# Patient Record
Sex: MALE | Race: BLACK OR AFRICAN AMERICAN | NOT HISPANIC OR LATINO | RURAL
[De-identification: names, ages, dates, MRNs, and addresses within clinical notes are randomized per-mention and may not be internally consistent; named-entity substitution may affect disease eponyms.]

---

## 2020-11-16 ENCOUNTER — HISTORICAL (OUTPATIENT)
Dept: ADMINISTRATIVE | Facility: HOSPITAL | Age: 70
End: 2020-11-16

## 2020-11-16 LAB
ANION GAP SERPL CALCULATED.3IONS-SCNC: 9.2 MMOL/L (ref 7–16)
BASOPHILS # BLD AUTO: 0.02 X10E3/UL (ref 0–0.2)
BASOPHILS NFR BLD AUTO: 0.6 % (ref 0–1)
BUN SERPL-MCNC: 30 MG/DL (ref 7–18)
CALCIUM SERPL-MCNC: 9.3 MG/DL (ref 8.5–10.1)
CHLORIDE SERPL-SCNC: 112 MMOL/L (ref 98–107)
CO2 SERPL-SCNC: 24 MMOL/L (ref 21–32)
CREAT SERPL-MCNC: 3.56 MG/DL (ref 0.7–1.3)
EOSINOPHIL # BLD AUTO: 0.07 X10E3/UL (ref 0–0.5)
EOSINOPHIL NFR BLD AUTO: 1.9 % (ref 1–4)
ERYTHROCYTE [DISTWIDTH] IN BLOOD BY AUTOMATED COUNT: 15.4 % (ref 11.5–14.5)
GLUCOSE SERPL-MCNC: 92 MG/DL (ref 74–106)
HCT VFR BLD AUTO: 39.9 % (ref 40–54)
HGB BLD-MCNC: 12.8 G/DL (ref 13.5–18)
IMM GRANULOCYTES # BLD AUTO: 0.01 X10E3/UL (ref 0–0.04)
IMM GRANULOCYTES NFR BLD: 0.3 % (ref 0–0.4)
INR BLD: 2.4 (ref 0–3.3)
LYMPHOCYTES # BLD AUTO: 0.93 X10E3/UL (ref 1–4.8)
LYMPHOCYTES NFR BLD AUTO: 25.8 % (ref 27–41)
MCH RBC QN AUTO: 29.6 PG (ref 27–31)
MCHC RBC AUTO-ENTMCNC: 32.1 G/DL (ref 32–36)
MCV RBC AUTO: 92.1 FL (ref 80–96)
MONOCYTES # BLD AUTO: 0.4 X10E3/UL (ref 0–0.8)
MONOCYTES NFR BLD AUTO: 11.1 % (ref 2–6)
MPC BLD CALC-MCNC: 9.9 FL (ref 9.4–12.4)
NEUTROPHILS # BLD AUTO: 2.18 X10E3/UL (ref 1.8–7.7)
NEUTROPHILS NFR BLD AUTO: 60.3 % (ref 53–65)
NRBC # BLD AUTO: 0 X10E3/UL (ref 0–0)
NRBC, AUTO (.00): 0 /100 (ref 0–0)
PLATELET # BLD AUTO: 205 X10E3/UL (ref 150–400)
POTASSIUM SERPL-SCNC: 5.2 MMOL/L (ref 3.5–5.1)
PROTHROMBIN TIME: 24.8 SECONDS (ref 11.7–14.7)
RBC # BLD AUTO: 4.33 X10E6/UL (ref 4.6–6.2)
SODIUM SERPL-SCNC: 140 MMOL/L (ref 136–145)
T4 FREE SERPL-MCNC: 1.24 NG/DL (ref 0.76–1.46)
TSH SERPL DL<=0.005 MIU/L-ACNC: 0.28 UIU/ML (ref 0.36–3.74)
WBC # BLD AUTO: 3.61 X10E3/UL (ref 4.5–11)

## 2020-12-15 ENCOUNTER — HISTORICAL (OUTPATIENT)
Dept: ADMINISTRATIVE | Facility: HOSPITAL | Age: 70
End: 2020-12-15

## 2020-12-15 LAB
INR BLD: 2.07 (ref 0–3.3)
PROTHROMBIN TIME: 22.1 SECONDS (ref 11.7–14.7)

## 2021-01-14 ENCOUNTER — HISTORICAL (OUTPATIENT)
Dept: ADMINISTRATIVE | Facility: HOSPITAL | Age: 71
End: 2021-01-14

## 2021-01-14 LAB
25(OH)D3 SERPL-MCNC: 45.7 NG/ML
ALBUMIN SERPL BCP-MCNC: 3.8 G/DL (ref 3.5–5)
ALBUMIN/GLOB SERPL: 1 {RATIO}
ALP SERPL-CCNC: 124 U/L (ref 45–115)
ALT SERPL W P-5'-P-CCNC: 10 U/L (ref 16–61)
ANION GAP SERPL CALCULATED.3IONS-SCNC: 8.7 MMOL/L (ref 7–16)
AST SERPL W P-5'-P-CCNC: 28 U/L (ref 15–37)
BASOPHILS # BLD AUTO: 0.04 X10E3/UL (ref 0–0.2)
BASOPHILS NFR BLD AUTO: 0.8 % (ref 0–1)
BILIRUB SERPL-MCNC: 0.5 MG/DL (ref 0–1.2)
BUN SERPL-MCNC: 32 MG/DL (ref 7–18)
BUN/CREAT SERPL: 9
CALCIUM SERPL-MCNC: 8.8 MG/DL (ref 8.5–10.1)
CHLORIDE SERPL-SCNC: 112 MMOL/L (ref 98–107)
CHOLEST SERPL-MCNC: 188 MG/DL
CHOLEST/HDLC SERPL: 3.4 {RATIO}
CO2 SERPL-SCNC: 25 MMOL/L (ref 21–32)
CREAT SERPL-MCNC: 3.43 MG/DL (ref 0.7–1.3)
EOSINOPHIL # BLD AUTO: 0.15 X10E3/UL (ref 0–0.5)
EOSINOPHIL NFR BLD AUTO: 3.1 % (ref 1–4)
ERYTHROCYTE [DISTWIDTH] IN BLOOD BY AUTOMATED COUNT: 15.2 % (ref 11.5–14.5)
GLOBULIN SER-MCNC: 4 G/DL (ref 2–4)
GLUCOSE SERPL-MCNC: 80 MG/DL (ref 74–106)
HCT VFR BLD AUTO: 40.7 % (ref 40–54)
HDLC SERPL-MCNC: 56 MG/DL
HGB BLD-MCNC: 13 G/DL (ref 13.5–18)
IMM GRANULOCYTES # BLD AUTO: 0.01 X10E3/UL (ref 0–0.04)
IMM GRANULOCYTES NFR BLD: 0.2 % (ref 0–0.4)
INR BLD: 2.44 (ref 0–3.3)
LDLC SERPL CALC-MCNC: 120 MG/DL
LYMPHOCYTES # BLD AUTO: 0.8 X10E3/UL (ref 1–4.8)
LYMPHOCYTES NFR BLD AUTO: 16.4 % (ref 27–41)
MCH RBC QN AUTO: 29.4 PG (ref 27–31)
MCHC RBC AUTO-ENTMCNC: 31.9 G/DL (ref 32–36)
MCV RBC AUTO: 92.1 FL (ref 80–96)
MONOCYTES # BLD AUTO: 0.45 X10E3/UL (ref 0–0.8)
MONOCYTES NFR BLD AUTO: 9.2 % (ref 2–6)
MPC BLD CALC-MCNC: 9.8 FL (ref 9.4–12.4)
NEUTROPHILS # BLD AUTO: 3.42 X10E3/UL (ref 1.8–7.7)
NEUTROPHILS NFR BLD AUTO: 70.3 % (ref 53–65)
NRBC # BLD AUTO: 0 X10E3/UL (ref 0–0)
NRBC, AUTO (.00): 0 /100 (ref 0–0)
PLATELET # BLD AUTO: 215 X10E3/UL (ref 150–400)
POTASSIUM SERPL-SCNC: 5.7 MMOL/L (ref 3.5–5.1)
PROT SERPL-MCNC: 7.8 G/DL (ref 6.4–8.2)
PROTHROMBIN TIME: 25.1 SECONDS (ref 11.7–14.7)
RBC # BLD AUTO: 4.42 X10E6/UL (ref 4.6–6.2)
SODIUM SERPL-SCNC: 140 MMOL/L (ref 136–145)
T4 FREE SERPL-MCNC: 1.33 NG/DL (ref 0.76–1.46)
TRIGL SERPL-MCNC: 62 MG/DL
TSH SERPL DL<=0.005 MIU/L-ACNC: 0.63 UIU/ML (ref 0.36–3.74)
URATE SERPL-MCNC: 4.7 MG/DL (ref 3.5–7.2)
WBC # BLD AUTO: 4.87 X10E3/UL (ref 4.5–11)

## 2021-02-01 ENCOUNTER — HISTORICAL (OUTPATIENT)
Dept: ADMINISTRATIVE | Facility: HOSPITAL | Age: 71
End: 2021-02-01

## 2021-02-01 LAB
ANION GAP SERPL CALCULATED.3IONS-SCNC: 17 MMOL/L
BUN SERPL-MCNC: 31 MG/DL (ref 7–18)
CALCIUM SERPL-MCNC: 9.3 MG/DL (ref 8.5–10.1)
CHLORIDE SERPL-SCNC: 107 MMOL/L (ref 98–107)
CO2 SERPL-SCNC: 22 MMOL/L (ref 21–32)
CREAT SERPL-MCNC: 3.6 MG/DL (ref 0.7–1.3)
GLUCOSE SERPL-MCNC: 87 MG/DL (ref 74–106)
MAGNESIUM SERPL-MCNC: 1.9 MG/DL (ref 1.7–2.3)
POTASSIUM SERPL-SCNC: 4.8 MMOL/L (ref 3.5–5.1)
SODIUM SERPL-SCNC: 141 MMOL/L (ref 136–145)

## 2021-08-09 PROCEDURE — 85610 PROTHROMBIN TIME: CPT | Performed by: FAMILY MEDICINE

## 2024-06-14 DIAGNOSIS — M54.59 OTHER LOW BACK PAIN: Primary | ICD-10-CM

## 2024-06-21 ENCOUNTER — CLINICAL SUPPORT (OUTPATIENT)
Dept: REHABILITATION | Facility: HOSPITAL | Age: 74
End: 2024-06-21
Payer: MEDICARE

## 2024-06-21 DIAGNOSIS — R26.89 BALANCE PROBLEM: Primary | ICD-10-CM

## 2024-06-21 DIAGNOSIS — G89.29 CHRONIC RIGHT-SIDED LOW BACK PAIN WITH RIGHT-SIDED SCIATICA: ICD-10-CM

## 2024-06-21 DIAGNOSIS — R26.9 GAIT ABNORMALITY: ICD-10-CM

## 2024-06-21 DIAGNOSIS — M54.41 CHRONIC RIGHT-SIDED LOW BACK PAIN WITH RIGHT-SIDED SCIATICA: ICD-10-CM

## 2024-06-21 DIAGNOSIS — M54.59 OTHER LOW BACK PAIN: ICD-10-CM

## 2024-06-21 PROBLEM — M54.9 CHRONIC RIGHT-SIDED BACK PAIN: Status: ACTIVE | Noted: 2024-06-21

## 2024-06-21 PROCEDURE — 97116 GAIT TRAINING THERAPY: CPT

## 2024-06-21 PROCEDURE — 97161 PT EVAL LOW COMPLEX 20 MIN: CPT

## 2024-06-21 PROCEDURE — 97140 MANUAL THERAPY 1/> REGIONS: CPT

## 2024-06-21 NOTE — PLAN OF CARE
"  [facility]   Physical Therapy Initial Evaluation      Name: Deep Huitron  Clinic Number: [id number]    Therapy Diagnosis:   Encounter Diagnoses   Name Primary?    Other low back pain     Balance problem Yes    Gait abnormality     Chronic right-sided low back pain with right-sided sciatica         Physician: Rama Holcomb MD    Physician Orders: PT Eval and Treat   Medical Diagnosis from Referral: low back pain   Evaluation Date: 6/21/2024  Authorization Period Expiration: 6/14/2025  Plan of Care Expiration: 7/19/2024  Progress Note Due: 7/19/2024  Date of Surgery: NA  Visit # / Visits authorized: 1/ 8   FOTO: to be completed on visit 4    Precautions: Fall     Time In: 8:40  Time Out: 9:30  Total Billable Time: 50 minutes    Subjective     Date of onset: chronic history     History of current condition - Deep reports: chronic history of parkinson's disease with recent increase in low back pain and more frequent falls. Patient reported "My neurologist said he is giving me the highest dose of medicine for my parkinson's disease." He reports having increased difficulty getting up and down from a chair and walking. He states that he and his wife live alone and he feels like his mobility is declining. Patient reports having pain in his R low back and into his R leg that is also limiting his mobility and activity tolerance.     Falls: frequent falls     Imaging: none    Prior Therapy: Patient reports that he has completed PT within the past year at another facility   Social History:  lives with their spouse  Occupation: Retired   Prior Level of Function: Independent   Current Level of Function: Independent     Pain:  "I hurt a little in my R side."     Patients goals: "decrease falls"      Medical History:   No past medical history on file.    Surgical History:   Deep Huitron  has no past surgical history on file.    Medications:   Deep currently has no medications in their medication " list.    Allergies:   Review of patient's allergies indicates:  Not on File     Objective    Posture:  Standing lordosis: Decreased  Sitting lordosis: Decreased  Trunk flexion: increased   Forward head: increased   Rounded shoulders: increased     MANUAL MUSCLE TEST  Right left   Hip flexion MMT number: 4-/5 MMT strength: 4-/5   Hip abduction MMT strength: 4-/5 MMT strength: 4-/5   Knee extension MMT strength: 4-/5 MMT strength: 4-/5   Knee flexion  MMT strength: 4-/5 MMT strength: 4-/5   Ankle dorsiflexion MMT strength: 4-/5 MMT strength: 4-/5   Ankle plantar flexion  MMT strength: 4-/5 MMT strength: 4-/5   Extensor hallucis longus MMT strength: 4-/5 MMT strength: 4-/5       Gait assessment:   Step length: decreased   Step width: decreased   Nely: decreased   Decreased foot clearance and occasional scissoring gait   Assistive device: none  Gait Speed: 0.61 feet per second    Patient requires multiple attempts and occasional min A to complete sit to stand transfer.       Intake Outcome Measure for FOTO  Survey    Therapist reviewed FOTO scores for Deep Huitron on 6/21/2024.   FOTO report - see Media section or FOTO account episode details.    Intake Score: 58%         Treatment     Total Treatment time (time-based codes) separate from Evaluation: 33 minutes     Deep received the treatments listed below:      manual therapy techniques: Soft tissue Mobilization were applied to the: B upper trapezius and levator scapulae muscles for 10 minutes to decrease tissue tightness and pain to increase cervical extension during mobility tasks.     therapeutic activities to improve functional performance for 7 minutes, including: mobility training including proper sequencing to increase independence with sit to stand transfers.   Standing marching with assistive device use to increase foot clearance during ambulation.     Gait Training: 10 minutes PT completed gait training with rollator walker x three trials at 95 feet with  focus on improving upright posture, increasing step width, and increasing foot clearance. PT also provided patient education on proper use of rollator walker including locking and unlocking brakes.     hot pack for 6 minutes to B cervical muscles.    Patient Education and Home Exercises     Education provided:   - eval findings, plan of care, and Home exercise program     Written Home Exercises Provided: yes. Exercises were reviewed and Deep was able to demonstrate them prior to the end of the session.  Deep demonstrated fair  understanding of the education provided. See EMR under Patient Instructions for exercises provided during therapy sessions.    Assessment     Deep is a 74 y.o. male referred to outpatient Physical Therapy with a medical diagnosis of low back pain. Patient presents to PT with concerns about declining functional mobility and increasing falls. Patient presents with muscle weakness, tissue tightness, impaired motor control, decreased mobility and back pain. Patient's impairments are limiting his safety and independence with functional mobility as well as limiting his activity tolerance.     PT provided patient with maximal cueing throughout session for proper sequencing to increase safety and independence with transfers and use of rollator walker for ambulation. PT instructed patient in standing marching to promote increased foot clearance during ambulation. PT noted that patient has difficulty holding his head up during ambulation. PT noted that patient had significant cervical muscle tightness upon palpation that decreased with modalities and manual interventions. Patient was able to hold his head up better following manual therapy. PT to contact patient's referring physician about getting a prescription for an assistive device to improve safety with ambulation in patient's environment.      Patient prognosis is Good.   Patient will benefit from skilled outpatient Physical Therapy to address  the deficits stated above and in the chart below, provide patient /family education, and to maximize patientt's level of independence.     Plan of care discussed with patient: Yes  Patient's spiritual, cultural and educational needs considered and patient is agreeable to the plan of care and goals as stated below:     Anticipated Barriers for therapy: chronicity of back pain and parkinson's disease     Medical Necessity is demonstrated by the following  History  Co-morbidities and personal factors that may impact the plan of care [] LOW: no personal factors / co-morbidities  [x] MODERATE: 1-2 personal factors / co-morbidities  [] HIGH: 3+ personal factors / co-morbidities    Moderate / High Support Documentation:   Co-morbidities affecting plan of care: parkinson's diseases     Personal Factors:   no deficits     Examination  Body Structures and Functions, activity limitations and participation restrictions that may impact the plan of care [] LOW: addressing 1-2 elements  [x] MODERATE: 3+ elements  [] HIGH: 4+ elements (please support below)    Moderate / High Support Documentation: balance, strength, motor control      Clinical Presentation [x] LOW: stable  [] MODERATE: Evolving  [] HIGH: Unstable     Decision Making/ Complexity Score: low       Goals:  Short Term Goals: 2 weeks   Patient will independently complete Home exercise program with correct form.   Patient will demonstrate increased independence with sit to stand transfers     Long Term Goals: 4 weeks   Patient will ambulate with increased gait speed to greater than or equal to 1.25 feet per second with assistive device for increased safety with functional mobility.   Patient will independently complete 5 x sit<>stand for increased LE power and independence.   Patient will report decreased R low back pain to less than or equal to 5/10 for improved activty   Plan     Plan of care Certification: 6/21/2024 to 7/19/2024.    Outpatient Physical Therapy 2 times  weekly for 4 weeks to include the following interventions: Gait Training, Manual Therapy, Moist Heat/ Ice, Neuromuscular Re-ed, Patient Education, Therapeutic Activities, and Therapeutic Exercise.     Seun Curiel PT, DPT         Physician's Signature: _________________________________________ Date: ________________

## 2024-06-27 ENCOUNTER — CLINICAL SUPPORT (OUTPATIENT)
Dept: REHABILITATION | Facility: HOSPITAL | Age: 74
End: 2024-06-27
Payer: OTHER GOVERNMENT

## 2024-06-27 DIAGNOSIS — M54.50 CHRONIC RIGHT-SIDED LOW BACK PAIN, UNSPECIFIED WHETHER SCIATICA PRESENT: ICD-10-CM

## 2024-06-27 DIAGNOSIS — G89.29 CHRONIC RIGHT-SIDED LOW BACK PAIN, UNSPECIFIED WHETHER SCIATICA PRESENT: ICD-10-CM

## 2024-06-27 DIAGNOSIS — R26.9 GAIT ABNORMALITY: ICD-10-CM

## 2024-06-27 DIAGNOSIS — R26.89 BALANCE PROBLEM: Primary | ICD-10-CM

## 2024-06-27 PROCEDURE — 97530 THERAPEUTIC ACTIVITIES: CPT | Mod: CQ

## 2024-06-27 PROCEDURE — 97116 GAIT TRAINING THERAPY: CPT | Mod: CQ

## 2024-06-27 PROCEDURE — 97110 THERAPEUTIC EXERCISES: CPT | Mod: CQ

## 2024-06-27 NOTE — PROGRESS NOTES
"[facility]   Physical Therapy Treatment Note      Name: Deep Huitron  Clinic Number: [id number]    Therapy Diagnosis: No diagnosis found.  Physician: Rama Holcomb MD    Visit Date: 2024    Physician Orders: PT Eval and Treat   Medical Diagnosis from Referral: low back pain   Evaluation Date: 2024  Authorization Period Expiration: 2025  Plan of Care Expiration: 2024  Progress Note Due: 2024  Date of Surgery: NA  Visit # / Visits authorized:    FOTO: to be completed on visit 4     Precautions: Fall      Time In: 8:50  Time Out: 9:28  Total Billable Time: 38 minutes    PTA Visit #:        Subjective     Patient reports: "I have a little pain in my hip today."  He was compliant with home exercise program.  Response to previous treatment: N/A  Functional change: Too early in Plan of Care     Pain: 4/10  Location: right hip      Objective      Objective Measures updated at progress report unless specified.     Treatment     Deep received the treatments listed below:      Ther-Ex Reps   Hip ADD 2 x 10   Seated heel raises 2 x 10   LAQ's 2 x 10   Hamstring curls 2 x 10 yellow TB   Seated Marching 2 x 10    Hip ABD 2 x 10       Ther-Act Reps   Sit<>Stands 8 x   Heel raises  2 x 10   Mini Squats 2 x 10   Standing marches 2 x 10     Gait Trainin minutes LPTA completed gait training with rollator walker x three trials at 95 feet with focus on improving upright posture, increasing step width, and increasing foot clearance. LPTA also provided pt education on proper use of rollator walker including locking and unlocking brakes.        Patient Education and Home Exercises       Education provided:   - Plan of Care, Delayed onset muscle soreness, Home exercise program     Written Home Exercises Provided: Patient instructed to cont prior HEP. Exercises were reviewed and Deep was able to demonstrate them prior to the end of the session.  Deep demonstrated good  " understanding of the education provided. See Electronic Medical Record under Patient Instructions for exercises provided during therapy sessions    Assessment     Deep is a 74 y.o. male referred to outpatient Physical Therapy with a medical diagnosis of low back pain. Patient presents to PT with concerns about declining functional mobility and increasing falls. Patient presents with muscle weakness, tissue tightness, impaired motor control, decreased mobility and back pain. Patient's impairments are limiting his safety and independence with functional mobility as well as limiting his activity tolerance. LPTA provided pt with proper setup on NuStep to decrease stiffness and improve musculoskeletal warmup prior to exercises. Pt required moderate verbal, visual, and tactile cues for proper form, count, hold times, and decrease compensations. Pt c/o hip minimal hip pain throughout tx. No adverse effects noted or reported.        Patient prognosis is Good.   Patient will benefit from skilled outpatient Physical Therapy to address the deficits stated above and in the chart below, provide patient /family education, and to maximize patientt's level of independence.      Plan of care discussed with patient: Yes  Patient's spiritual, cultural and educational needs considered and patient is agreeable to the plan of care and goals as stated below:      Anticipated Barriers for therapy: chronicity of back pain and parkinson's disease     Goals:  Short Term Goals: 2 weeks   Patient will independently complete Home exercise program with correct form.   Patient will demonstrate increased independence with sit to stand transfers      Long Term Goals: 4 weeks   Patient will ambulate with increased gait speed to greater than or equal to 1.25 feet per second with assistive device for increased safety with functional mobility.   Patient will independently complete 5 x sit<>stand for increased LE power and independence.   Patient will  report decreased R low back pain to less than or equal to 5/10 for improved activty     Plan   Plan of care Certification: 6/21/2024 to 7/19/2024.     Outpatient Physical Therapy 2 times weekly for 4 weeks to include the following interventions: Gait Training, Manual Therapy, Moist Heat/ Ice, Neuromuscular Re-ed, Patient Education, Therapeutic Activities, and Therapeutic Exercise.    Continue POC per PT orders to progress patient toward rehab goals.    EVERETT Guido  6/27/2024

## 2024-07-02 ENCOUNTER — CLINICAL SUPPORT (OUTPATIENT)
Dept: REHABILITATION | Facility: HOSPITAL | Age: 74
End: 2024-07-02
Payer: OTHER GOVERNMENT

## 2024-07-02 DIAGNOSIS — R26.9 GAIT ABNORMALITY: ICD-10-CM

## 2024-07-02 DIAGNOSIS — G89.29 CHRONIC RIGHT-SIDED LOW BACK PAIN, UNSPECIFIED WHETHER SCIATICA PRESENT: ICD-10-CM

## 2024-07-02 DIAGNOSIS — M54.50 CHRONIC RIGHT-SIDED LOW BACK PAIN, UNSPECIFIED WHETHER SCIATICA PRESENT: ICD-10-CM

## 2024-07-02 DIAGNOSIS — R26.89 BALANCE PROBLEM: Primary | ICD-10-CM

## 2024-07-02 PROCEDURE — 97530 THERAPEUTIC ACTIVITIES: CPT | Mod: CQ

## 2024-07-02 PROCEDURE — 97110 THERAPEUTIC EXERCISES: CPT | Mod: CQ

## 2024-07-02 NOTE — PROGRESS NOTES
"[facility]   Physical Therapy Treatment Note      Name: Deep Huitron  Clinic Number: [id number]    Therapy Diagnosis: No diagnosis found.  Physician: Rama Holcomb MD    Visit Date: 7/2/2024    Physician Orders: PT Eval and Treat   Medical Diagnosis from Referral: low back pain   Evaluation Date: 6/21/2024  Authorization Period Expiration: 6/14/2025  Plan of Care Expiration: 7/19/2024  Progress Note Due: 7/19/2024  Date of Surgery: NA  Visit # / Visits authorized: 3/ 8   FOTO: to be completed on visit 4     Precautions: Fall      Time In: 8:54  Time Out:9:32  Total Billable Time: 38 minutes     PTA Visit #: 2/5      Subjective     Patient reports: "I'm doing okay".      He was compliant with home exercise program.  Response to previous treatment: N/A  Functional change: Too early in Plan of Care     Pain: 4/10  Location: right hip      Objective      Objective Measures updated at progress report unless specified.     Treatment     Deep received the treatments listed below:      Ther-Ex Reps   Nustep  8 minutes *    Hip ADD 2 x 10   Seated heel raises 2 x 10   LAQ's 2 x 10   Hamstring curls 2 x 10 yellow TB   Seated Marching 2 x 10    Hip ABD 2 x 10       Ther-Act Reps   Sit<>Stands 8 x (not completed)    Heel raises  2 x 10   Mini Squats 2 x 10   Standing marches 2 x 10   Standing forward facing wall slides BL Ue's  10 x    Standing forward facing scap retraction  2 x 10        Patient Education and Home Exercises       Education provided:   - Plan of Care, Delayed onset muscle soreness, Home exercise program     Written Home Exercises Provided: Patient instructed to cont prior HEP. Exercises were reviewed and Deep was able to demonstrate them prior to the end of the session.  Deep demonstrated good  understanding of the education provided. See Electronic Medical Record under Patient Instructions for exercises provided during therapy sessions    Assessment     Deep is a 74 y.o. male " referred to outpatient Physical Therapy with a medical diagnosis of low back pain. Patient presents to PT with concerns about declining functional mobility and increasing falls. Patient presents with muscle weakness, tissue tightness, impaired motor control, decreased mobility and back pain. Patient's impairments are limiting his safety and independence with functional mobility as well as limiting his activity tolerance. LPTA provided pt with proper setup on NuStep to decrease stiffness and improve musculoskeletal warmup prior to exercises. Pt required moderate verbal, visual, and tactile cues for proper form, count, hold times, and decrease compensations.  With-held sit to stand to sit secondary to Parkinson tremor's and freezing made completion of reps difficult for patient.  Added postural correction therapeutic activities to treatment.  No adverse effects noted or reported.       Patient prognosis is Good.   Patient will benefit from skilled outpatient Physical Therapy to address the deficits stated above and in the chart below, provide patient /family education, and to maximize patientt's level of independence.      Plan of care discussed with patient: Yes  Patient's spiritual, cultural and educational needs considered and patient is agreeable to the plan of care and goals as stated below:      Anticipated Barriers for therapy: chronicity of back pain and parkinson's disease     Goals:  Short Term Goals: 2 weeks   Patient will independently complete Home exercise program with correct form.   Patient will demonstrate increased independence with sit to stand transfers      Long Term Goals: 4 weeks   Patient will ambulate with increased gait speed to greater than or equal to 1.25 feet per second with assistive device for increased safety with functional mobility.   Patient will independently complete 5 x sit<>stand for increased LE power and independence.   Patient will report decreased R low back pain to less  than or equal to 5/10 for improved activty     Plan   Plan of care Certification: 6/21/2024 to 7/19/2024.     Outpatient Physical Therapy 2 times weekly for 4 weeks to include the following interventions: Gait Training, Manual Therapy, Moist Heat/ Ice, Neuromuscular Re-ed, Patient Education, Therapeutic Activities, and Therapeutic Exercise.    Continue POC per PT orders to progress patient toward rehab goals.    EVERETT Vicente   7/2/2024

## 2024-07-05 ENCOUNTER — CLINICAL SUPPORT (OUTPATIENT)
Dept: REHABILITATION | Facility: HOSPITAL | Age: 74
End: 2024-07-05
Payer: OTHER GOVERNMENT

## 2024-07-05 DIAGNOSIS — R26.89 BALANCE PROBLEM: Primary | ICD-10-CM

## 2024-07-05 DIAGNOSIS — G89.29 CHRONIC RIGHT-SIDED LOW BACK PAIN, UNSPECIFIED WHETHER SCIATICA PRESENT: ICD-10-CM

## 2024-07-05 DIAGNOSIS — R26.9 GAIT ABNORMALITY: ICD-10-CM

## 2024-07-05 DIAGNOSIS — M54.50 CHRONIC RIGHT-SIDED LOW BACK PAIN, UNSPECIFIED WHETHER SCIATICA PRESENT: ICD-10-CM

## 2024-07-05 PROCEDURE — 97110 THERAPEUTIC EXERCISES: CPT | Mod: CQ

## 2024-07-05 PROCEDURE — 97530 THERAPEUTIC ACTIVITIES: CPT | Mod: CQ

## 2024-07-05 NOTE — PROGRESS NOTES
"[facility]   Physical Therapy Treatment Note      Name: Deep Huitron  Clinic Number: [id number]    Therapy Diagnosis:   Encounter Diagnoses   Name Primary?    Balance problem Yes    Gait abnormality     Chronic right-sided low back pain, unspecified whether sciatica present      Physician: Rama Holcomb MD    Visit Date: 7/5/2024    Physician Orders: PT Eval and Treat   Medical Diagnosis from Referral: low back pain   Evaluation Date: 6/21/2024  Authorization Period Expiration: 6/14/2025  Plan of Care Expiration: 7/19/2024  Progress Note Due: 7/19/2024  Date of Surgery: NA  Visit # / Visits authorized: 3/ 8   FOTO: to be completed on visit 4     Precautions: Fall      Time In: 10:00  Time Out: 10:42  Total Billable Time: 42 minutes     PTA Visit #: 3/5      Subjective     Patient reports: "I'm having a little pain in my hip".      He was compliant with home exercise program.  Response to previous treatment: N/A  Functional change: Too early in Plan of Care     Pain: 5/10  Location: right hip      Objective      Objective Measures updated at progress report unless specified.     Treatment     Deep received the treatments listed below:      Ther-Ex Reps   Nustep  8 minutes    Hip ADD 2 x 10   Seated heel raises 2 x 10   LAQ's 2 x 10   Hamstring curls 2 x 10 yellow TB   Seated Marching 2 x 10    Hip ABD 2 x 10       Ther-Act Reps   Sit<>Stands 8 x    Heel raises  2 x 10   Mini Squats 2 x 10   Standing marches 2 x 10   Standing forward facing wall slides BL Ue's  10 x (Not today)   Standing forward facing scap retraction  2 x 10 (Not today)       Patient Education and Home Exercises       Education provided:   - Plan of Care, Delayed onset muscle soreness, Home exercise program     Written Home Exercises Provided: Patient instructed to cont prior HEP. Exercises were reviewed and Deep was able to demonstrate them prior to the end of the session.  Deep demonstrated good  understanding of the " education provided. See Electronic Medical Record under Patient Instructions for exercises provided during therapy sessions    Assessment     Deep is a 74 y.o. male referred to outpatient Physical Therapy with a medical diagnosis of low back pain. Patient presents to PT with concerns about declining functional mobility and increasing falls. Patient presents with muscle weakness, tissue tightness, impaired motor control, decreased mobility and back pain. Patient's impairments are limiting his safety and independence with functional mobility as well as limiting his activity tolerance. LPTA provided pt with proper setup on NuStep to decrease stiffness and improve musculoskeletal warmup prior to exercises.  Pt displays improved ability to complete seated hip abduction with improved strength and movement through full range of motion noted. Pt required moderate verbal, visual, and tactile cues for proper form, count, hold times, and decrease compensations.  Pt able to complete sit to stands with improve functional mobility noted and no episodes of freezing.  No adverse effects noted or reported.       Patient prognosis is Good.   Patient will benefit from skilled outpatient Physical Therapy to address the deficits stated above and in the chart below, provide patient /family education, and to maximize patientt's level of independence.      Plan of care discussed with patient: Yes  Patient's spiritual, cultural and educational needs considered and patient is agreeable to the plan of care and goals as stated below:      Anticipated Barriers for therapy: chronicity of back pain and parkinson's disease     Goals:  Short Term Goals: 2 weeks   Patient will independently complete Home exercise program with correct form.   Patient will demonstrate increased independence with sit to stand transfers      Long Term Goals: 4 weeks   Patient will ambulate with increased gait speed to greater than or equal to 1.25 feet per second with  assistive device for increased safety with functional mobility.   Patient will independently complete 5 x sit<>stand for increased LE power and independence.   Patient will report decreased R low back pain to less than or equal to 5/10 for improved activty     Plan   Plan of care Certification: 6/21/2024 to 7/19/2024.     Outpatient Physical Therapy 2 times weekly for 4 weeks to include the following interventions: Gait Training, Manual Therapy, Moist Heat/ Ice, Neuromuscular Re-ed, Patient Education, Therapeutic Activities, and Therapeutic Exercise.    Continue POC per PT orders to progress patient toward rehab goals.    EVERETT Guido   7/5/2024

## 2024-07-09 ENCOUNTER — CLINICAL SUPPORT (OUTPATIENT)
Dept: REHABILITATION | Facility: HOSPITAL | Age: 74
End: 2024-07-09
Payer: OTHER GOVERNMENT

## 2024-07-09 DIAGNOSIS — R26.9 GAIT ABNORMALITY: ICD-10-CM

## 2024-07-09 DIAGNOSIS — M54.50 CHRONIC RIGHT-SIDED LOW BACK PAIN, UNSPECIFIED WHETHER SCIATICA PRESENT: ICD-10-CM

## 2024-07-09 DIAGNOSIS — R26.89 BALANCE PROBLEM: Primary | ICD-10-CM

## 2024-07-09 DIAGNOSIS — G89.29 CHRONIC RIGHT-SIDED LOW BACK PAIN, UNSPECIFIED WHETHER SCIATICA PRESENT: ICD-10-CM

## 2024-07-09 PROCEDURE — 97110 THERAPEUTIC EXERCISES: CPT | Mod: CQ

## 2024-07-09 PROCEDURE — 97530 THERAPEUTIC ACTIVITIES: CPT | Mod: CQ

## 2024-07-09 NOTE — PROGRESS NOTES
"[facility]   Physical Therapy Treatment Note      Name: Deep Huitron  Clinic Number: [id number]    Therapy Diagnosis:   Encounter Diagnoses   Name Primary?    Balance problem Yes    Gait abnormality     Chronic right-sided low back pain, unspecified whether sciatica present      Physician: Rama Holcomb MD    Visit Date: 7/9/2024    Physician Orders: PT Eval and Treat   Medical Diagnosis from Referral: low back pain   Evaluation Date: 6/21/2024  Authorization Period Expiration: 6/14/2025  Plan of Care Expiration: 7/19/2024  Progress Note Due: 7/19/2024  Date of Surgery: NA  Visit # / Visits authorized: 5/ 8   FOTO: to be completed on visit 4     Precautions: Fall      Time In: 8:45  Time Out: 9:25  Total Billable Time: 40 minutes     PTA Visit #: 3/5      Subjective     Patient reports: "I feel a little weak because I haven't eaten this morning."      He was compliant with home exercise program.  Response to previous treatment: N/A  Functional change: Too early in Plan of Care     Pain: 5/10  Location: right hip      Objective      Objective Measures updated at progress report unless specified.     Treatment     Deep received the treatments listed below:      Ther-Ex Reps   Nustep  8 minutes    Hip ADD 2 x 10   Seated heel raises 2 x 10   LAQ's 2 x 10   Hamstring curls 2 x 10 yellow TB   Seated Marching 2 x 10    Hip ABD 2 x 10       Ther-Act Reps   Sit<>Stands 8 x    Heel raises  2 x 10   Mini Squats 2 x 10   Standing marches 2 x 10   Standing forward facing wall slides BL Ue's  10 x (Not today)   Standing forward facing scap retraction  2 x 10 (Not today)       Patient Education and Home Exercises       Education provided:   - Plan of Care, Delayed onset muscle soreness, Home exercise program     Written Home Exercises Provided: Patient instructed to cont prior HEP. Exercises were reviewed and Deep was able to demonstrate them prior to the end of the session.  Deep demonstrated good  " understanding of the education provided. See Electronic Medical Record under Patient Instructions for exercises provided during therapy sessions    Assessment     Deep is a 74 y.o. male referred to outpatient Physical Therapy with a medical diagnosis of low back pain. Patient presents to PT with concerns about declining functional mobility and increasing falls. Patient presents with muscle weakness, tissue tightness, impaired motor control, decreased mobility and back pain. Patient's impairments are limiting his safety and independence with functional mobility as well as limiting his activity tolerance. LPTA provided pt with proper setup on NuStep to decrease stiffness and improve musculoskeletal warmup prior to exercises.  Pt displays some difficulty completing seated activities secondary to increased weakness. Pt required moderate verbal, visual, and tactile cues for proper form, count, hold times, and decrease compensations.  Pt completes standing activities with less difficulty noted.  No adverse effects noted or reported.       Patient prognosis is Good.   Patient will benefit from skilled outpatient Physical Therapy to address the deficits stated above and in the chart below, provide patient /family education, and to maximize patientt's level of independence.      Plan of care discussed with patient: Yes  Patient's spiritual, cultural and educational needs considered and patient is agreeable to the plan of care and goals as stated below:      Anticipated Barriers for therapy: chronicity of back pain and parkinson's disease     Goals:  Short Term Goals: 2 weeks   Patient will independently complete Home exercise program with correct form.   Patient will demonstrate increased independence with sit to stand transfers      Long Term Goals: 4 weeks   Patient will ambulate with increased gait speed to greater than or equal to 1.25 feet per second with assistive device for increased safety with functional mobility.    Patient will independently complete 5 x sit<>stand for increased LE power and independence.   Patient will report decreased R low back pain to less than or equal to 5/10 for improved activty     Plan   Plan of care Certification: 6/21/2024 to 7/19/2024.     Outpatient Physical Therapy 2 times weekly for 4 weeks to include the following interventions: Gait Training, Manual Therapy, Moist Heat/ Ice, Neuromuscular Re-ed, Patient Education, Therapeutic Activities, and Therapeutic Exercise.    Continue POC per PT orders to progress patient toward rehab goals.    EVERETT Guido   7/9/2024

## 2024-07-12 ENCOUNTER — CLINICAL SUPPORT (OUTPATIENT)
Dept: REHABILITATION | Facility: HOSPITAL | Age: 74
End: 2024-07-12
Payer: OTHER GOVERNMENT

## 2024-07-12 DIAGNOSIS — G89.29 CHRONIC RIGHT-SIDED LOW BACK PAIN, UNSPECIFIED WHETHER SCIATICA PRESENT: ICD-10-CM

## 2024-07-12 DIAGNOSIS — M54.50 CHRONIC RIGHT-SIDED LOW BACK PAIN, UNSPECIFIED WHETHER SCIATICA PRESENT: ICD-10-CM

## 2024-07-12 DIAGNOSIS — R26.89 BALANCE PROBLEM: Primary | ICD-10-CM

## 2024-07-12 DIAGNOSIS — R26.9 GAIT ABNORMALITY: ICD-10-CM

## 2024-07-12 PROCEDURE — 97110 THERAPEUTIC EXERCISES: CPT | Mod: CQ

## 2024-07-12 NOTE — PROGRESS NOTES
"[facility]   Physical Therapy Treatment Note      Name: Deep Huitron  Clinic Number: [id number]    Therapy Diagnosis:   Encounter Diagnoses   Name Primary?    Balance problem Yes    Gait abnormality     Chronic right-sided low back pain, unspecified whether sciatica present      Physician: Rama Holcomb MD    Visit Date: 7/12/2024    Physician Orders: PT Eval and Treat   Medical Diagnosis from Referral: low back pain   Evaluation Date: 6/21/2024  Authorization Period Expiration: 6/14/2025  Plan of Care Expiration: 7/19/2024  Progress Note Due: 7/19/2024  Date of Surgery: NA  Visit # / Visits authorized: 6/ 8   FOTO: to be completed on visit 4     Precautions: Fall      Time In: 8:51  Time Out: 9:32  Total Billable Time: 41 minutes     PTA Visit #: 5/5      Subjective     Patient reports: "I feel a little weak."      He was compliant with home exercise program.  Response to previous treatment: N/A  Functional change: Too early in Plan of Care     Pain: 5/10  Location: right hip      Objective      Objective Measures updated at progress report unless specified.     Treatment     Deep received the treatments listed below:      Ther-Ex Reps   Nustep  8 minutes    Hip ADD 2 x 10   Seated heel raises 2 x 10   LAQ's 2 x 10   Hamstring curls 2 x 10 yellow TB   Seated Marching 2 x 10    Hip ABD 2 x 10       Ther-Act Reps   Sit<>Stands 8 x    Heel raises  2 x 10   Mini Squats 2 x 10   Standing marches 2 x 10   Standing forward facing wall slides BL Ue's  10 x (Not today)   Standing forward facing scap retraction  2 x 10 (Not today)       Patient Education and Home Exercises       Education provided:   - Plan of Care, Delayed onset muscle soreness, Home exercise program     Written Home Exercises Provided: Patient instructed to cont prior HEP. Exercises were reviewed and Deep was able to demonstrate them prior to the end of the session.  Deep demonstrated good  understanding of the education " provided. See Electronic Medical Record under Patient Instructions for exercises provided during therapy sessions    Assessment     Deep is a 74 y.o. male referred to outpatient Physical Therapy with a medical diagnosis of low back pain. Patient presents to PT with concerns about declining functional mobility and increasing falls. Patient presents with muscle weakness, tissue tightness, impaired motor control, decreased mobility and back pain. Patient's impairments are limiting his safety and independence with functional mobility as well as limiting his activity tolerance. LPTA provided pt with proper setup on NuStep to decrease stiffness and improve musculoskeletal warmup prior to exercises.  Pt displays some difficulty completing seated marches secondary to increased weakness. Therapeutic activity with-held secondary to safety concerns with increased weakness. No adverse effects noted or reported.       Patient prognosis is Good.   Patient will benefit from skilled outpatient Physical Therapy to address the deficits stated above and in the chart below, provide patient /family education, and to maximize patientt's level of independence.      Plan of care discussed with patient: Yes  Patient's spiritual, cultural and educational needs considered and patient is agreeable to the plan of care and goals as stated below:      Anticipated Barriers for therapy: chronicity of back pain and parkinson's disease     Goals:  Short Term Goals: 2 weeks   Patient will independently complete Home exercise program with correct form.   Patient will demonstrate increased independence with sit to stand transfers      Long Term Goals: 4 weeks   Patient will ambulate with increased gait speed to greater than or equal to 1.25 feet per second with assistive device for increased safety with functional mobility.   Patient will independently complete 5 x sit<>stand for increased LE power and independence.   Patient will report decreased R  low back pain to less than or equal to 5/10 for improved activty     Plan   Plan of care Certification: 6/21/2024 to 7/19/2024.     Outpatient Physical Therapy 2 times weekly for 4 weeks to include the following interventions: Gait Training, Manual Therapy, Moist Heat/ Ice, Neuromuscular Re-ed, Patient Education, Therapeutic Activities, and Therapeutic Exercise.    Continue POC per PT orders to progress patient toward rehab goals.    EVERETT Guido   7/12/2024

## 2024-07-16 ENCOUNTER — CLINICAL SUPPORT (OUTPATIENT)
Dept: REHABILITATION | Facility: HOSPITAL | Age: 74
End: 2024-07-16
Payer: OTHER GOVERNMENT

## 2024-07-16 DIAGNOSIS — R26.89 BALANCE PROBLEM: Primary | ICD-10-CM

## 2024-07-16 DIAGNOSIS — M54.41 CHRONIC RIGHT-SIDED LOW BACK PAIN WITH RIGHT-SIDED SCIATICA: ICD-10-CM

## 2024-07-16 DIAGNOSIS — G89.29 CHRONIC RIGHT-SIDED LOW BACK PAIN WITH RIGHT-SIDED SCIATICA: ICD-10-CM

## 2024-07-16 DIAGNOSIS — R26.9 GAIT ABNORMALITY: ICD-10-CM

## 2024-07-16 PROCEDURE — 97110 THERAPEUTIC EXERCISES: CPT

## 2024-07-16 PROCEDURE — 97530 THERAPEUTIC ACTIVITIES: CPT

## 2024-07-16 NOTE — PROGRESS NOTES
"[facility]   Physical Therapy Treatment Note      Name: Deep Huitron  Clinic Number: [id number]    Therapy Diagnosis:   Encounter Diagnoses   Name Primary?    Balance problem Yes    Gait abnormality     Chronic right-sided low back pain with right-sided sciatica      Physician: Rama Holcomb MD    Visit Date: 7/16/2024    Physician Orders: PT Eval and Treat   Medical Diagnosis from Referral: low back pain   Evaluation Date: 6/21/2024  Authorization Period Expiration: 6/14/2025  Plan of Care Expiration: 7/19/2024  Progress Note Due: 7/19/2024  Date of Surgery: NA  Visit # / Visits authorized: 7/ 8   FOTO: to be completed on visit 4     Precautions: Fall      Time In: 8:46  Time Out: 9:24  Total Billable Time: 39 minutes     PTA Visit #: 0/5      Subjective     Patient reports: "I feel a little weak."      He was compliant with home exercise program.  Response to previous treatment: N/A  Functional change: Too early in Plan of Care     Pain: 5/10  Location: right hip      Objective      Objective Measures updated at progress report unless specified.     Treatment     Deep received the treatments listed below:      Ther-Ex Reps   Nustep  8 minutes    Hip ADD 2 x 10   Seated heel raises 2 x 10   LAQ's 2 x 10   Hamstring curls 2 x 10 yellow TB   Seated Marching 2 x 10    Hip ABD 2 x 10       Ther-Act Reps   Sit<>Stands 8 x    Heel raises  2 x 10   Mini Squats 2 x 10   Standing marches 2 x 10   Standing forward facing wall slides BL Ue's  10 x (Not today)   Standing forward facing scap retraction  2 x 10 (Not today)       Patient Education and Home Exercises       Education provided:   - Plan of Care, Delayed onset muscle soreness, Home exercise program     Written Home Exercises Provided: Patient instructed to cont prior HEP. Exercises were reviewed and Deep was able to demonstrate them prior to the end of the session.  Deep demonstrated good  understanding of the education provided. See " "Electronic Medical Record under Patient Instructions for exercises provided during therapy sessions    Assessment     Deep is a 74 y.o. male referred to outpatient Physical Therapy with a medical diagnosis of low back pain. Patient presents to PT with concerns about declining functional mobility and increasing falls. Patient presents with muscle weakness, tissue tightness, impaired motor control, decreased mobility and back pain. Patient's impairments are limiting his safety and independence with functional mobility as well as limiting his activity tolerance. Patient continues to require maximal verbal and tactile cues throughout treatment for completion of exercises with proper form. Patient has multiple episodes of "freezing" during functional transfers. Patient's R upper extremity tremor increases with focus on exercises. Patient had no adverse effects to treatment. PT completed patient goal assessment for last scheduled PT visit. Patient demonstrates increased mobility at this time however PT still feels that patient's mobility is unsafe and that patient could benefit from use of a rolling walking to increased safety with ambulation and transfers. Patient reports that is scheduled to return to [place] tomorrow.      Patient prognosis is Good.   Patient will benefit from skilled outpatient Physical Therapy to address the deficits stated above and in the chart below, provide patient /family education, and to maximize patientt's level of independence.      Plan of care discussed with patient: Yes  Patient's spiritual, cultural and educational needs considered and patient is agreeable to the plan of care and goals as stated below:      Anticipated Barriers for therapy: chronicity of back pain and parkinson's disease     Goals:  Short Term Goals: 2 weeks   Patient will independently complete Home exercise program with correct form. -MET   Patient will demonstrate increased independence with sit to stand transfers -MET    "   Long Term Goals: 4 weeks   Patient will ambulate with increased gait speed to greater than or equal to 1.25 feet per second with assistive device for increased safety with functional mobility. -Not met 1.19 feet per second   Patient will independently complete 5 x sit<>stand for increased LE power and independence. -MET   Patient will report decreased R low back pain to less than or equal to 5/10 for improved activty -Met 0/10     Plan   Discharge to Home exercise program   Seun Curiel, PT, DPT    7/16/2024

## 2024-08-13 ENCOUNTER — HOSPITAL ENCOUNTER (OUTPATIENT)
Dept: RADIOLOGY | Facility: HOSPITAL | Age: 74
Discharge: HOME | End: 2024-08-13
Attending: FAMILY MEDICINE
Payer: OTHER GOVERNMENT

## 2024-08-13 DIAGNOSIS — G20.C PARKINSONISM: ICD-10-CM

## 2024-08-13 PROCEDURE — 70551 MRI BRAIN STEM W/O DYE: CPT | Mod: TC
